# Patient Record
Sex: FEMALE | URBAN - METROPOLITAN AREA
[De-identification: names, ages, dates, MRNs, and addresses within clinical notes are randomized per-mention and may not be internally consistent; named-entity substitution may affect disease eponyms.]

---

## 2019-03-05 ENCOUNTER — APPOINTMENT (RX ONLY)
Dept: URBAN - METROPOLITAN AREA CLINIC 141 | Facility: CLINIC | Age: 29
Setting detail: DERMATOLOGY
End: 2019-03-05

## 2019-03-05 DIAGNOSIS — Z41.9 ENCOUNTER FOR PROCEDURE FOR PURPOSES OTHER THAN REMEDYING HEALTH STATE, UNSPECIFIED: ICD-10-CM

## 2019-03-05 PROCEDURE — ? FACIAL

## 2019-03-05 ASSESSMENT — LOCATION ZONE DERM: LOCATION ZONE: FACE

## 2019-03-05 ASSESSMENT — LOCATION DETAILED DESCRIPTION DERM: LOCATION DETAILED: RIGHT FOREHEAD

## 2019-03-05 ASSESSMENT — LOCATION SIMPLE DESCRIPTION DERM: LOCATION SIMPLE: RIGHT FOREHEAD

## 2019-03-05 NOTE — PROCEDURE: FACIAL
Price (Use Numbers Only, No Special Characters Or $): 0.00
Exfoliation Type: enzyme peel
Detail Level: Zone
Facial Steaming: steamed
Mask Type (Optional): calming

## 2019-08-19 ENCOUNTER — APPOINTMENT (RX ONLY)
Dept: URBAN - METROPOLITAN AREA CLINIC 141 | Facility: CLINIC | Age: 29
Setting detail: DERMATOLOGY
End: 2019-08-19

## 2019-08-19 DIAGNOSIS — Z41.9 ENCOUNTER FOR PROCEDURE FOR PURPOSES OTHER THAN REMEDYING HEALTH STATE, UNSPECIFIED: ICD-10-CM

## 2019-08-19 PROCEDURE — ? FACIAL

## 2019-08-19 ASSESSMENT — LOCATION DETAILED DESCRIPTION DERM: LOCATION DETAILED: INFERIOR MID FOREHEAD

## 2019-08-19 ASSESSMENT — LOCATION ZONE DERM: LOCATION ZONE: FACE

## 2019-08-19 ASSESSMENT — LOCATION SIMPLE DESCRIPTION DERM: LOCATION SIMPLE: INFERIOR FOREHEAD

## 2019-08-19 NOTE — PROCEDURE: FACIAL
Treatment Type (Optional): Deep Cleanse Treatment
Detail Level: Zone
Exfoliation Type: enzyme peel
Price (Use Numbers Only, No Special Characters Or $): 0.00
Mask Type (Optional): calming
Comments (Non-Sticky): Sonic scrubber for extractions
Facial Steaming: steamed

## 2019-11-26 ENCOUNTER — OFFICE VISIT (OUTPATIENT)
Dept: URBAN - METROPOLITAN AREA CLINIC 32 | Facility: CLINIC | Age: 29
End: 2019-11-26

## 2019-11-26 DIAGNOSIS — H52.13 MYOPIA, BILATERAL: ICD-10-CM

## 2019-11-26 DIAGNOSIS — H16.012 CENTRAL CORNEAL ULCER, LEFT EYE: Primary | ICD-10-CM

## 2019-11-26 PROCEDURE — 92310 CONTACT LENS FITTING OU: CPT | Performed by: OPTOMETRIST

## 2019-11-26 ASSESSMENT — INTRAOCULAR PRESSURE
OS: 13
OD: 15

## 2019-11-26 NOTE — IMPRESSION/PLAN
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  Plan: besivance QID OS, lotemax QID OS AT PRN dc cls wear RTC 1 day

## 2023-03-07 ENCOUNTER — OFFICE VISIT (OUTPATIENT)
Dept: URBAN - METROPOLITAN AREA CLINIC 32 | Facility: CLINIC | Age: 33
End: 2023-03-07
Payer: COMMERCIAL

## 2023-03-07 DIAGNOSIS — H10.023 CONJUNCTIVITIS - BACTERIAL BILATERAL: Primary | ICD-10-CM

## 2023-03-07 DIAGNOSIS — H52.13 MYOPIA, BILATERAL: ICD-10-CM

## 2023-03-07 PROCEDURE — 92310 CONTACT LENS FITTING OU: CPT | Performed by: OPTOMETRIST

## 2023-03-07 ASSESSMENT — INTRAOCULAR PRESSURE
OS: 14
OD: 14

## 2023-03-07 NOTE — IMPRESSION/PLAN
Impression: Conjunctivitis - Bacterial Bilateral: H10.023. Plan: Educated patient on condition. Use warm compresses and AFTs to help with symptoms. RTC if symptoms become worse.

## 2023-08-23 ENCOUNTER — OFFICE VISIT (OUTPATIENT)
Dept: URBAN - METROPOLITAN AREA CLINIC 32 | Facility: CLINIC | Age: 33
End: 2023-08-23
Payer: COMMERCIAL

## 2023-08-23 DIAGNOSIS — H52.13 MYOPIA, BILATERAL: Primary | ICD-10-CM

## 2023-08-23 PROCEDURE — 92012 INTRM OPH EXAM EST PATIENT: CPT | Performed by: OPTOMETRIST

## 2023-08-23 ASSESSMENT — KERATOMETRY
OD: 44.38
OS: 44.00

## 2023-08-23 ASSESSMENT — VISUAL ACUITY
OS: 20/20
OD: 20/25

## 2023-08-23 ASSESSMENT — INTRAOCULAR PRESSURE
OS: 15
OD: 15